# Patient Record
Sex: MALE | Employment: STUDENT | URBAN - METROPOLITAN AREA
[De-identification: names, ages, dates, MRNs, and addresses within clinical notes are randomized per-mention and may not be internally consistent; named-entity substitution may affect disease eponyms.]

---

## 2018-12-20 ENCOUNTER — OFFICE VISIT (OUTPATIENT)
Dept: FAMILY MEDICINE | Facility: CLINIC | Age: 18
End: 2018-12-20
Payer: COMMERCIAL

## 2018-12-20 VITALS
HEIGHT: 67 IN | TEMPERATURE: 98.6 F | DIASTOLIC BLOOD PRESSURE: 76 MMHG | SYSTOLIC BLOOD PRESSURE: 119 MMHG | OXYGEN SATURATION: 97 % | RESPIRATION RATE: 20 BRPM | WEIGHT: 132 LBS | BODY MASS INDEX: 20.72 KG/M2 | HEART RATE: 79 BPM

## 2018-12-20 DIAGNOSIS — K62.5 RECTAL BLEEDING: Primary | ICD-10-CM

## 2018-12-20 LAB
BASOPHILS # BLD AUTO: 0 10E9/L (ref 0–0.2)
BASOPHILS NFR BLD AUTO: 0.3 %
DIFFERENTIAL METHOD BLD: NORMAL
EOSINOPHIL # BLD AUTO: 0.1 10E9/L (ref 0–0.7)
EOSINOPHIL NFR BLD AUTO: 2.1 %
ERYTHROCYTE [DISTWIDTH] IN BLOOD BY AUTOMATED COUNT: 12.2 % (ref 10–15)
HCT VFR BLD AUTO: 45 % (ref 40–53)
HGB BLD-MCNC: 15.2 G/DL (ref 13.3–17.7)
LYMPHOCYTES # BLD AUTO: 2.2 10E9/L (ref 0.8–5.3)
LYMPHOCYTES NFR BLD AUTO: 35.4 %
MCH RBC QN AUTO: 32.4 PG (ref 26.5–33)
MCHC RBC AUTO-ENTMCNC: 33.8 G/DL (ref 31.5–36.5)
MCV RBC AUTO: 96 FL (ref 78–100)
MONOCYTES # BLD AUTO: 0.8 10E9/L (ref 0–1.3)
MONOCYTES NFR BLD AUTO: 12.9 %
NEUTROPHILS # BLD AUTO: 3 10E9/L (ref 1.6–8.3)
NEUTROPHILS NFR BLD AUTO: 49.3 %
PLATELET # BLD AUTO: 268 10E9/L (ref 150–450)
RBC # BLD AUTO: 4.69 10E12/L (ref 4.4–5.9)
WBC # BLD AUTO: 6.1 10E9/L (ref 4–11)

## 2018-12-20 PROCEDURE — 85025 COMPLETE CBC W/AUTO DIFF WBC: CPT | Performed by: PHYSICIAN ASSISTANT

## 2018-12-20 PROCEDURE — 99213 OFFICE O/P EST LOW 20 MIN: CPT | Performed by: PHYSICIAN ASSISTANT

## 2018-12-20 PROCEDURE — 36415 COLL VENOUS BLD VENIPUNCTURE: CPT | Performed by: PHYSICIAN ASSISTANT

## 2018-12-20 ASSESSMENT — MIFFLIN-ST. JEOR: SCORE: 1559.44

## 2018-12-20 ASSESSMENT — PAIN SCALES - GENERAL: PAINLEVEL: NO PAIN (0)

## 2018-12-20 NOTE — PROGRESS NOTES
"  SUBJECTIVE:   Patrick Guzman is a 18 year old male who presents to clinic today for the following health issues:      Concern - Blood in stool  Onset: Dec 12th    Description:   Blood in stool, has happened a couple times, a few spots of blood in stool and some on tissue    Intensity: 0/10    Progression of Symptoms:  same    Accompanying Signs & Symptoms:  Not eating as much like before    Previous history of similar problem:   Yes    Precipitating factors:   Worsened by: None      Therapies Tried and outcome: none   no dizziness or SOB.  No pain but sometimes has to strain but not alwAys.  .              No Known Allergies    Patient Active Problem List   Diagnosis     GERD (gastroesophageal reflux disease)       Past Medical History:   Diagnosis Date     NONSPECIFIC MEDICAL HISTORY     Full Term-Healthy     Speech delay     Positive         No current outpatient medications on file prior to visit.  No current facility-administered medications on file prior to visit.     Social History     Tobacco Use     Smoking status: Passive Smoke Exposure - Never Smoker     Smokeless tobacco: Never Used     Tobacco comment: Mother outside   Substance Use Topics     Alcohol use: No     Alcohol/week: 0.0 oz     Drug use: No       ROS:   GEN: NO fevers  ABD bleeding as above  NEURO: no dizziness    OBJECTIVE:  /76 (BP Location: Left arm, Patient Position: Sitting, Cuff Size: Adult Regular)   Pulse 79   Temp 98.6  F (37  C) (Oral)   Resp 20   Ht 1.689 m (5' 6.5\")   Wt 59.9 kg (132 lb)   SpO2 97%   BMI 20.99 kg/m     General:   awake, alert, and cooperative.  NAD.   Head: Normocephalic, atraumatic.  Eyes: Conjunctiva clear,   Lungs: Regular rate  Neuro: Alert and oriented - normal speech.    ASSESSMENT:    ICD-10-CM    1. Rectal bleeding K62.5 CBC with platelets differential     Fecal colorectal cancer screen FIT     JUST IN CASE       PLAN:   Follow up: pending labs   Advised about symptoms which might herald more " serious problems.

## 2018-12-20 NOTE — LETTER
December 20, 2018      Patrick Guzman  [address] [facility] [id number]        Dear ,    We are writing to inform you of your test results. Your blood test was normal. If you have any questions or concerns, please call the clinic at the number listed above.       Sincerely,    SHAY Carpio/arielle    Resulted Orders   CBC with platelets differential   Result Value Ref Range    WBC 6.1 4.0 - 11.0 10e9/L    RBC Count 4.69 4.4 - 5.9 10e12/L    Hemoglobin 15.2 13.3 - 17.7 g/dL    Hematocrit 45.0 40.0 - 53.0 %    MCV 96 78 - 100 fl    MCH 32.4 26.5 - 33.0 pg    MCHC 33.8 31.5 - 36.5 g/dL    RDW 12.2 10.0 - 15.0 %    Platelet Count 268 150 - 450 10e9/L    % Neutrophils 49.3 %    % Lymphocytes 35.4 %    % Monocytes 12.9 %    % Eosinophils 2.1 %    % Basophils 0.3 %    Absolute Neutrophil 3.0 1.6 - 8.3 10e9/L    Absolute Lymphocytes 2.2 0.8 - 5.3 10e9/L    Absolute Monocytes 0.8 0.0 - 1.3 10e9/L    Absolute Eosinophils 0.1 0.0 - 0.7 10e9/L    Absolute Basophils 0.0 0.0 - 0.2 10e9/L    Diff Method Automated Method

## 2018-12-20 NOTE — PATIENT INSTRUCTIONS
Patient Education     Understanding Rectal Bleeding    Rectal bleeding is when blood passes through your rectum and anus. It can happen with or without a bowel movement. Rectal bleeding may be a sign of a serious problem in your rectum, colon, or upper GI tract. Call your healthcare provider right away if you have any rectal bleeding.  The GI Tract  The gastrointestinal (GI) tract includes the mouth, esophagus, stomach, small intestine, large intestine (colon), rectum, and anus. The food you eat is digested as it passes through the GI tract. Solid waste leaves the body through the rectum.   Rectal bleeding and GI problems  The cause of rectal bleeding may be found in any region of the GI tract. The colon or rectum may be the site of your bleeding problem. Or, bleeding may be due to problems farther up the GI tract, such as in the small intestine, duodenum, or stomach.  Causes of rectal bleeding  Rectal bleeding causes include the following:    Hemorrhoids (swollen veins in the rectum and anus)    Fissures (tears in or near the anus)    Diverticulosis (inflamed pockets in the colon wall)    Infection    Ischemia (low blood flow)    Radiation damage    Inflammatory bowel disease (Crohn's disease or ulcerative colitis)    Ulcers in the upper GI tract and inflammation of the large intestine    Abnormal tissue growths (tumors or polyps) in the GI tract    A bulging rectum (also called a rectal prolapse)    Abnormal blood vessels in the small intestine or in the colon  Common symptoms  Common symptoms include the following:    Rectal pain, itching, or soreness    Belly pain or epigastric pain    Minor occasional drops of blood that appear on the stool or toilet paper, to greater amounts of stool that appear black or tarry   Rectal bleeding can also happen without pain.  Date Last Reviewed: 7/1/2016 2000-2018 [employer]. [address] [place] [address]. All rights reserved. This information is  not intended as a substitute for professional medical care. Always follow your healthcare professional's instructions.

## 2018-12-20 NOTE — RESULT ENCOUNTER NOTE
Please send letter if doesn't check mychart.  Brady Galloway PA-C    Mr. Guzman,    Your blood test was normal.    Please contact the clinic if you have additional questions or if symptoms persist.  Thank you.    Sincerely,    Keith Galloway

## 2018-12-21 PROCEDURE — 82274 ASSAY TEST FOR BLOOD FECAL: CPT | Performed by: PHYSICIAN ASSISTANT

## 2018-12-22 DIAGNOSIS — K62.5 RECTAL BLEEDING: ICD-10-CM

## 2018-12-22 LAB — HEMOCCULT STL QL IA: POSITIVE

## 2018-12-24 ENCOUNTER — TELEPHONE (OUTPATIENT)
Dept: URGENT CARE | Facility: URGENT CARE | Age: 18
End: 2018-12-24

## 2018-12-24 DIAGNOSIS — K62.5 RECTAL BLEEDING: Primary | ICD-10-CM

## 2018-12-24 NOTE — TELEPHONE ENCOUNTER
Please mail patient results which confirmed the rectal bleeding and provide number for GI referral I have ordered in this encounter- [phone]    Brady Galloway PA-C

## 2019-01-04 ENCOUNTER — TELEPHONE (OUTPATIENT)
Dept: FAMILY MEDICINE | Facility: CLINIC | Age: 19
End: 2019-01-04

## 2019-01-04 ENCOUNTER — NURSE TRIAGE (OUTPATIENT)
Dept: NURSING | Facility: CLINIC | Age: 19
End: 2019-01-04

## 2019-01-04 NOTE — TELEPHONE ENCOUNTER
17 y/o male calls in for Lab results, RN able to read him results, Fecal blood positive, GI consult made. He has additional questions for the provider, encrourged him to call back when clinic is open.    Dianna Jauregui RN - [place] Nurse Advisor  1/04/2019   3:58AM

## 2019-01-04 NOTE — TELEPHONE ENCOUNTER
Reason for Call:  Other     Detailed comments: patient received a letter yesterday about the results. Patient asking if it is for sure that the results are negative for cancer?  Also asking why he needs to be seen by a GI doctor?  Patient is leaving for [place] on Sunday the 6th, and if he can be seen there for the next step he needs to do? He will be there for 3 months.  Patient is asking if he could get a call back as soon as possible?    Phone Number Patient can be reached at: Cell number on file:    Telephone Information:   Mobile [phone]       Best Time: any    Can we leave a detailed message on this number? YES    Call taken on 1/4/2019 at 9:20 AM by Estella Tan

## 2019-01-04 NOTE — TELEPHONE ENCOUNTER
This writer attempted to contact Patrick on 01/04/19      Reason for call discuss results and questions/concerns and left message.      If patient calls back:   Patient contacted by a Registered Nurse. Inform patient that someone from the RN group will contact them, document that pt called and route to P DYAD 3 RN POOL [[id number]]        Viri Martinez RN

## 2019-01-07 NOTE — TELEPHONE ENCOUNTER
This writer attempted to contact Patrick on 01/07/19      Reason for call questions/concerns and left message.      If patient calls back:   Patient contacted by a Registered Nurse. Inform patient that someone from the RN group will contact them, document that pt called and route to P DYAD 3 RN POOL [[id number]]        Keith Wilson RN, BSN

## 2019-01-08 NOTE — TELEPHONE ENCOUNTER
It is very unlikely he has cancer, but should f/u with a colorectal specialist to confirm the diagnosis of likley internal hemorrhoids.  This could wait until he returns or can follow-up in [place].        Brady Galloway PA-C

## 2019-01-08 NOTE — TELEPHONE ENCOUNTER
Provider, it appears that patient is now in [place]. Do you advise patient see a family practice MD there to get a referral for GI or should patient wait until he returns in 3 months?     Routing to provider to review and advise.     Gabrielle Andujar RN

## 2019-01-08 NOTE — TELEPHONE ENCOUNTER
Left detailed message for the patient with the information written by the provider.    Angelina Mullins RN, [facility]

## 2019-08-14 ENCOUNTER — OFFICE VISIT (OUTPATIENT)
Dept: URGENT CARE | Facility: URGENT CARE | Age: 19
End: 2019-08-14
Payer: COMMERCIAL

## 2019-08-14 VITALS
OXYGEN SATURATION: 100 % | HEART RATE: 64 BPM | DIASTOLIC BLOOD PRESSURE: 65 MMHG | SYSTOLIC BLOOD PRESSURE: 109 MMHG | BODY MASS INDEX: 21.34 KG/M2 | WEIGHT: 134.2 LBS | TEMPERATURE: 97.9 F | RESPIRATION RATE: 16 BRPM

## 2019-08-14 DIAGNOSIS — L25.5 CONTACT DERMATITIS DUE TO PLANTS, EXCEPT FOOD, UNSPECIFIED CONTACT DERMATITIS TYPE: Primary | ICD-10-CM

## 2019-08-14 PROCEDURE — 99213 OFFICE O/P EST LOW 20 MIN: CPT | Performed by: FAMILY MEDICINE

## 2019-08-14 RX ORDER — TRIAMCINOLONE ACETONIDE 1 MG/G
CREAM TOPICAL 2 TIMES DAILY
Qty: 453.6 G | Refills: 0 | Status: SHIPPED | OUTPATIENT
Start: 2019-08-14 | End: 2024-05-29

## 2019-08-14 NOTE — PATIENT INSTRUCTIONS
Patient Education     Poison Ivy Rash  You have a rash and itching. This is a delayed reaction to the oils of the poison ivy plant. You likely came in contact with it during the 3 days before your symptoms began. Your skin will become red and itchy. Small blisters may appear. These can break and leak a clear yellow fluid. This fluid is not contagious. The reaction usually starts to go away after 1 to 2 weeks. But it may take 4 to 6 weeks to fully clear.    Home care  Follow these guidelines when caring for yourself at home:    The plant oils still on your skin or clothes can be spread to other places on your body. They can also be passed on to other people and cause a similar reaction. That s why it s important to wash all of the plant oils off your skin and any clothes that may have been exposed. Wash all clothes that you were wearing. Use hot water with ordinary laundry detergent.    Don't use over-the-counter creams that have neomycin or bacitracin. These may make the rash worse.    Stay away from anything that heats up your skin. This includes hot showers or baths, or direct sunlight. These can make itching worse.    Put a cold compress on areas that are leaking (weeping), or on blistered areas. Do this for 30 minutes 3 times a day. To make a cold compress, dip a wash cloth in a mixture of 1 pint of cold water and 1 packet of astringent or oatmeal bath powder. Keep the solution in the refrigerator for future use.    If large areas of skin are affected, take a lukewarm bath. Add colloidal oatmeal, or 1 cup of cornstarch or baking soda to the water.    For a rash in a smaller area, use hydrocortisone cream for redness and irritation. But don t use this if another medicine was prescribed. For severe itching, put an ice pack on the area. To make an ice pack, put ice cubes in a plastic bag that seals at the top. Wrap the bag in a clean, thin towel or cloth. Never put ice or an ice pack directly on the skin.  Over-the-counter products that have calamine lotion may also be helpful.    You can also use an oral antihistamine medicine with diphenhydramine for itching, unless another medicine was prescribed. This medicine may make you sleepy. So use lower doses during the daytime and higher doses at bedtime. Don t use medicine that has diphenhydramine if you have glaucoma. Also don t use it if you are a man who has trouble urinating because of an enlarged prostate. Antihistamines with loratidine cause less drowsiness. They are a good choice for daytime use.    For severe cases, your provider may prescribe oral steroid medicines. Always take these exactly as prescribed.  Follow-up care  Follow up with your healthcare provider, or as directed. Call your provider if your rash gets worse or you are not starting to get better after 1 week of treatment.  When to seek medical advice  Call your healthcare provider right away if any of these occur:    Spreading facial rash with swollen mouth or eyelids    Rash that spreads to the groin and causes swelling of the penis, scrotum, or vaginal area    Trouble urinating because of swelling in the genital area  Also call your provider if you have signs of infection in the areas of broken blisters:    Spreading redness    Pus or fluid draining from the blisters    Yellow-brown crusts form over the open blisters    Fever of 1 degree, or higher, above your normal temperature, or as directed by your provider  Call 911  Call 911 if you have severe swelling on your face, eyelids, mouth, throat, or tongue.  Date Last Reviewed: 8/1/2016 2000-2018 [employer]. [address] [place] [address], [place] [address]. All rights reserved. This information is not intended as a substitute for professional medical care. Always follow your healthcare professional's instructions.

## 2019-08-14 NOTE — PROGRESS NOTES
Chief complaint: rash    Patient has been outside helping some friend  Exposed to poison ivy   9 days ago started notiicing an itchy rash on both legs  Has tried calamine lotion  And also some on the arms         Progression of Symptoms:  worsening    Accompanying Signs & Symptoms:  Fever: no   Body aches or joint pain: no   Sore throat symptoms: no   Recent cold symptoms: no    History:   Previous similar rash: no     Precipitating factors:   Exposure to similar rash: no   New exposures: grasses   Recent travel: no     Alleviating factors:  none     Therapies Tried and outcome: above      Problem list, Medication list, Allergies, and Medical/Social/Surgical histories reviewed in EPIC and updated as appropriate.    ROS:  Constitutional, HEENT, cardiovascular, pulmonary, gi and gu systems are negative, except as otherwise noted.    OBJECTIVE:                                                    /65   Pulse 64   Temp 97.9  F (36.6  C) (Oral)   Resp 16   Wt 60.9 kg (134 lb 3.2 oz)   SpO2 100%   BMI 21.34 kg/m    Body mass index is 21.34 kg/m .  GENERAL: healthy, alert and no distress  Neurologic: Cranial nerves intact no gross neurological deficits  Psych: appropriate mood and affect  MS: no gross musculoskeletal defects noted, no edema  Skin: scaly excoriated itchy erythematous dry plaques with linear configuration over bilateral lower extremities mostly  Some on upper extremities  No signs or symptoms of secondary infection- reviewed with patient      Diagnostic Test Results:  none      ASSESSMENT/PLAN:                                                    No diagnosis found.      ICD-10-CM    1. Contact dermatitis due to plants, except food, unspecified contact dermatitis type L25.5 triamcinolone (KENALOG) 0.1 % external cream     See AVS  Patient thinks it's getting better with calamine however just still persisted  He declines oral prednisone   Zyrtec as needed itching ( less sedating than benadryl but  still watch for sedation)  Signs or symptoms of secondary bacterial infection discussed if concerns come back in.   Recommend follow up in clinic or dermatology if no relief , sooner if worse  Adverse reactions of medications discussed.  Over the counter medications discussed.   Aware to come back in if with worsening symptoms or if no relief despite treatment plan  Patient voiced understanding and had no further questions.     MD Evette Anaya MD  [facility]

## 2022-02-01 ENCOUNTER — OFFICE VISIT (OUTPATIENT)
Dept: FAMILY MEDICINE | Facility: CLINIC | Age: 22
End: 2022-02-01
Payer: COMMERCIAL

## 2022-02-01 VITALS
SYSTOLIC BLOOD PRESSURE: 113 MMHG | HEIGHT: 67 IN | HEART RATE: 103 BPM | BODY MASS INDEX: 21.35 KG/M2 | DIASTOLIC BLOOD PRESSURE: 77 MMHG | TEMPERATURE: 99.2 F | OXYGEN SATURATION: 98 % | WEIGHT: 136 LBS

## 2022-02-01 DIAGNOSIS — B27.99 INFECTIOUS MONONUCLEOSIS, WITH OTHER COMPLICATION, INFECTIOUS MONONUCLEOSIS DUE TO UNSPECIFIED ORGANISM: Primary | ICD-10-CM

## 2022-02-01 DIAGNOSIS — J03.90 TONSILLITIS: ICD-10-CM

## 2022-02-01 PROCEDURE — 99213 OFFICE O/P EST LOW 20 MIN: CPT | Performed by: PHYSICIAN ASSISTANT

## 2022-02-01 RX ORDER — DOXYCYCLINE 100 MG/1
100 CAPSULE ORAL 2 TIMES DAILY
Qty: 20 CAPSULE | Refills: 0 | Status: SHIPPED | OUTPATIENT
Start: 2022-02-01 | End: 2022-02-11

## 2022-02-01 RX ORDER — PREDNISONE 20 MG/1
40 TABLET ORAL DAILY
Qty: 10 TABLET | Refills: 0 | Status: SHIPPED | OUTPATIENT
Start: 2022-02-01 | End: 2022-02-06

## 2022-02-01 RX ORDER — LIDOCAINE HYDROCHLORIDE 20 MG/ML
15 SOLUTION OROPHARYNGEAL
Qty: 100 ML | Refills: 1 | Status: SHIPPED | OUTPATIENT
Start: 2022-02-01 | End: 2024-05-29

## 2022-02-01 ASSESSMENT — MIFFLIN-ST. JEOR: SCORE: 1562.58

## 2022-02-01 NOTE — PROGRESS NOTES
"  {PROVIDER CHARTING PREFERENCE:418723}    Wilfred Nguyen is a 21 year old who presents for the following health issues {ACCOMPANIED BY STATEMENT (Optional):311090}    HPI     Acute Illness  Acute illness concerns: Swollen Tonsils  Onset/Duration: ***  Symptoms:  Fever: {.:963000::\"no\"}  Chills/Sweats: {.:603438::\"no\"}  Headache (location?): {.:532440::\"no\"}  Sinus Pressure: {.:507211::\"no\"}  Conjunctivitis:  {.:831308::\"no\"}  Ear Pain: {.:845688::\"no\"}  Rhinorrhea: {.:178037::\"no\"}  Congestion: {.:735138::\"no\"}  Sore Throat: {.:922770::\"no\"}  Cough: {.:296572::\"no\"}  Wheeze: {.:696271::\"no\"}  Decreased Appetite: {.:713359::\"no\"}  Nausea: {.:151813::\"no\"}  Vomiting: {.:639097::\"no\"}  Diarrhea: {.:739095::\"no\"}  Dysuria/Freq.: {.:138315::\"no\"}  Dysuria or Hematuria: {.:697379::\"no\"}  Fatigue/Achiness: {.:917609::\"no\"}  Sick/Strep Exposure: {.:164251::\"no\"}  Therapies tried and outcome: {NONEORCHOOSE:712684::\"None\"}  {additonal problems for provider to add (Optional):457058}    Review of Systems   {ROS COMP (Optional):536754}      Objective    There were no vitals taken for this visit.  There is no height or weight on file to calculate BMI.  Physical Exam   {Exam List (Optional):144672}    {Diagnostic Test Results (Optional):036339}    {AMBULATORY ATTESTATION (Optional):052533}        "

## 2022-02-01 NOTE — PROGRESS NOTES
"  Assessment & Plan       ICD-10-CM    1. Infectious mononucleosis, with other complication, infectious mononucleosis due to unspecified organism  B27.99 predniSONE (DELTASONE) 20 MG tablet     doxycycline hyclate (VIBRAMYCIN) 100 MG capsule     Otolaryngology Referral     lidocaine, viscous, (XYLOCAINE) 2 % solution   2. Tonsillitis  J03.90 predniSONE (DELTASONE) 20 MG tablet     doxycycline hyclate (VIBRAMYCIN) 100 MG capsule     Otolaryngology Referral     lidocaine, viscous, (XYLOCAINE) 2 % solution     Talk to patient on his concerns we'll get him started on prednisone and doxycycline and viscous lidocaine solution. Will have follow-up with ENT in the next couple days. Warning signs side effects were discussed. If he continues to not be able to get a drink much he may need to be seen in the emergency room.    Return in about 2 days (around 2/3/2022).    Carlton Quinonez PA-C  [facility]   Patrick is a 21 year old who presents for the following health issues     HPI     Mass in the back of the throat that has doubled in size in the past 2 days. Patient also has mono. Patient states he is dehydrated due not being able to swollow.     WON Uribe    He states he was diagnosed with mono at a [facility] clinic. He states he has been having a difficult time swallowing and eating. He is hardly being able to swallow water at all he can hardly swallow spit because of the pain in the right tonsillar. It radiates into his right ear. He denies any fevers chills or body aches. He has fatigue.    Review of Systems   Constitutional, HEENT, cardiovascular, pulmonary, gi and gu systems are negative, except as otherwise noted.      Objective    /77   Pulse 103   Temp 99.2  F (37.3  C) (Tympanic)   Ht 1.689 m (5' 6.5\")   Wt 61.7 kg (136 lb)   SpO2 98%   BMI 21.62 kg/m    Body mass index is 21.62 kg/m .  Physical Exam   GENERAL: healthy, alert and no distress  Head: Normocephalic, " atraumatic.  Eyes: Conjunctiva clear, non icteric. PERRLA.  Ears: External ears and TMs normal BL.  Nose: Septum midline, nasal mucosa pink and moist. No discharge.  Mouth / Throat: Normal dentition.  No oral lesions. Pharynx  erythematous, tonsils with hypertrophy. White exudates right tonsil  Neck: Supple,  enlarged LN, trachea midline.  Heart: S1 and S2 normal, no murmurs, clicks, gallops or rubs. Regular rate and rhythm. Chest: Clear; no wheezes or rales.  The abdomen is soft without tenderness, guarding, mass, rebound or organomegaly. Bowel sounds are normal.

## 2022-02-11 ENCOUNTER — OFFICE VISIT (OUTPATIENT)
Dept: OTOLARYNGOLOGY | Facility: CLINIC | Age: 22
End: 2022-02-11
Payer: COMMERCIAL

## 2022-02-11 VITALS
HEART RATE: 92 BPM | DIASTOLIC BLOOD PRESSURE: 63 MMHG | RESPIRATION RATE: 16 BRPM | OXYGEN SATURATION: 100 % | SYSTOLIC BLOOD PRESSURE: 98 MMHG

## 2022-02-11 DIAGNOSIS — J03.80 ACUTE TONSILLITIS DUE TO INFECTIOUS MONONUCLEOSIS: Primary | ICD-10-CM

## 2022-02-11 DIAGNOSIS — B27.90 ACUTE TONSILLITIS DUE TO INFECTIOUS MONONUCLEOSIS: Primary | ICD-10-CM

## 2022-02-11 PROCEDURE — 99203 OFFICE O/P NEW LOW 30 MIN: CPT | Performed by: OTOLARYNGOLOGY

## 2022-02-11 NOTE — PROGRESS NOTES
I am seeing this patient in consultation for mononucleosis, tonsillitis at the request of the provider Carlton Quinonez.    Chief Complaint - tonsillitis    History of Present Illness - Patrick Guzman is a 21 year old male with acute tonsillitis. The patient provides the history. He got mono 3 weeks ago. He developed a severe sore throat with swelling of the tonsils. He hasn't had prior tonsil problems. He shows a picture of severe tonsil swelling with exudate. He was given doxycycline and prednisone. He can swallow okay now. He feels much better.     Past Medical History -   Patient Active Problem List   Diagnosis     GERD (gastroesophageal reflux disease)       Current Medications -   Current Outpatient Medications:      doxycycline hyclate (VIBRAMYCIN) 100 MG capsule, Take 1 capsule (100 mg) by mouth 2 times daily for 10 days, Disp: 20 capsule, Rfl: 0     lidocaine, viscous, (XYLOCAINE) 2 % solution, Swish and spit 15 mLs in mouth every 3 hours as needed for moderate pain ; Max 8 doses/24 hour period., Disp: 100 mL, Rfl: 1     triamcinolone (KENALOG) 0.1 % external cream, Apply topically 2 times daily For 2 weeks (Patient not taking: Reported on 2/1/2022), Disp: 453.6 g, Rfl: 0    Allergies - No Known Allergies    Social History -   Social History     Socioeconomic History     Marital status: Single     Spouse name: Not on file     Number of children: Not on file     Years of education: Not on file     Highest education level: Not on file   Occupational History     Not on file   Tobacco Use     Smoking status: Passive Smoke Exposure - Never Smoker     Smokeless tobacco: Never Used     Tobacco comment: Mother outside   Substance and Sexual Activity     Alcohol use: No     Alcohol/week: 0.0 standard drinks     Drug use: No     Sexual activity: Not Currently   Other Topics Concern     Not on file   Social History Narrative     Not on file     Social Determinants of Health     Financial Resource Strain: Not on file    Food Insecurity: Not on file   Transportation Needs: Not on file   Physical Activity: Not on file   Stress: Not on file   Social Connections: Not on file   Intimate Partner Violence: Not on file   Housing Stability: Not on file     Review of Systems - As per HPI and PMHx, otherwise 10+ comprehensive system review is negative.    Physical Exam  BP 98/63   Pulse 92   Resp 16   SpO2 100%   General - The patient is in no distress.  Alert and oriented, answers questions and cooperates with examination appropriately.   Voice and Breathing - The patient was breathing comfortably without the use of accessory muscles. There was no wheezing, stridor, or stertor.  The patients voice was clear and strong.  Eyes - Extraocular movements intact. Sclera were not icteric or injected, conjunctiva were pink and moist.  Neurologic - Cranial nerves II-XII are grossly intact. Specifically, the facial nerve is intact, House-Brackmann grade 1 of 6.   Mouth - Examination of the oral cavity showed pink, healthy oral mucosa. No lesions or ulcerations noted.  The tongue was mobile and protrudes midline.  Oropharynx - The walls of the oropharynx were smooth, pink, moist, symmetric, and had no lesions or ulcerations.  The tonsils were 1-2+, pink, no exudate. The uvula was midline and the palate raised symmetrically.   Neck -  Soft, non-tender. Palpation of the occipital, submental, submandibular, internal jugular chain, and supraclavicular nodes did not demonstrate any abnormal lymph nodes or masses. The parotid glands were without masses. Palpation of the thyroid was soft and smooth, with no nodules or goiter appreciated.  The trachea was midline.    A/P - Patrick Guzman is a 21 year old male with a bout of infectious mononucleosis that caused acute tonsillitis.  This has significantly improved with doxycycline and prednisone.  He is able to swallow without difficulty.  I see no exudate.  I cautioned him on no vigorous activity, contact  sports, or weightlifting for the next month.  Has not had prior tonsillitis and therefore does not need to follow-up with us if this becomes a recurrent problem.     Manuel Bello MD  Otolaryngology  [facility]

## 2024-05-29 ENCOUNTER — OFFICE VISIT (OUTPATIENT)
Dept: FAMILY MEDICINE | Facility: CLINIC | Age: 24
End: 2024-05-29
Payer: COMMERCIAL

## 2024-05-29 VITALS
WEIGHT: 131.2 LBS | BODY MASS INDEX: 21.08 KG/M2 | OXYGEN SATURATION: 99 % | SYSTOLIC BLOOD PRESSURE: 114 MMHG | TEMPERATURE: 97 F | HEART RATE: 70 BPM | HEIGHT: 66 IN | RESPIRATION RATE: 14 BRPM | DIASTOLIC BLOOD PRESSURE: 73 MMHG

## 2024-05-29 DIAGNOSIS — Z13.1 SCREENING FOR DIABETES MELLITUS: ICD-10-CM

## 2024-05-29 DIAGNOSIS — Z13.21 ENCOUNTER FOR VITAMIN DEFICIENCY SCREENING: ICD-10-CM

## 2024-05-29 DIAGNOSIS — Z76.89 ENCOUNTER TO ESTABLISH CARE: Primary | ICD-10-CM

## 2024-05-29 DIAGNOSIS — F32.A DEPRESSION, UNSPECIFIED DEPRESSION TYPE: ICD-10-CM

## 2024-05-29 PROCEDURE — 99213 OFFICE O/P EST LOW 20 MIN: CPT

## 2024-05-29 RX ORDER — MULTIVITAMIN
1 TABLET ORAL DAILY
COMMUNITY

## 2024-05-29 ASSESSMENT — PATIENT HEALTH QUESTIONNAIRE - PHQ9
SUM OF ALL RESPONSES TO PHQ QUESTIONS 1-9: 16
SUM OF ALL RESPONSES TO PHQ QUESTIONS 1-9: 16
10. IF YOU CHECKED OFF ANY PROBLEMS, HOW DIFFICULT HAVE THESE PROBLEMS MADE IT FOR YOU TO DO YOUR WORK, TAKE CARE OF THINGS AT HOME, OR GET ALONG WITH OTHER PEOPLE: SOMEWHAT DIFFICULT

## 2024-05-29 ASSESSMENT — PAIN SCALES - GENERAL: PAINLEVEL: NO PAIN (0)

## 2024-05-29 NOTE — PROGRESS NOTES
Assessment & Plan     Encounter to establish care  - Reviewed patient's lifestyle, current concerns and history.  - Due for annual exam, encouraged to schedule.     Depression, unspecified depression type  - Elevated PHQ-9 today. Briefly discussed with patient. He declines resources today.     Encounter for vitamin deficiency screening  - Family history of vitamin D deficiency. Not on supplementation. Recommended 2000 international unit(s) of vitamin D daily.   - Vitamin D deficiency screening    Screening for diabetes mellitus  - Family history of diabetes.   - Glucose    Depression Screening Follow Up        5/29/2024     1:42 PM   PHQ   PHQ-9 Total Score 16   Q9: Thoughts of better off dead/self-harm past 2 weeks Several days   F/U: Thoughts of suicide or self-harm No   F/U: Safety concerns No         5/29/2024     1:42 PM   Last PHQ-9   1.  Little interest or pleasure in doing things 1   2.  Feeling down, depressed, or hopeless 1   3.  Trouble falling or staying asleep, or sleeping too much 3   4.  Feeling tired or having little energy 3   5.  Poor appetite or overeating 3   6.  Feeling bad about yourself 3   7.  Trouble concentrating 1   8.  Moving slowly or restless 0   Q9: Thoughts of better off dead/self-harm past 2 weeks 1   PHQ-9 Total Score 16   In the past two weeks have you had thoughts of suicide or self harm? No   Do you have concerns about your personal safety or the safety of others? No     Declines resources at this time. Seen in ER 2/2024 for passive suicidal thoughts without plan. He has been busy and struggling with self-care. Denies suicidal plan or intent, denies safety concerns.     Follow Up Actions Taken  Crisis resource information provided in the After Visit Summary  Patient declined referral.  Follow up offered as needed.     Discussed the following ways the patient can remain in a safe environment:  be around others    Due for annual exam, patient encouraged to schedule.  "    Wilfred Nguyen is a 24 year old, presenting for the following health issues:  Establish Care        5/29/2024     1:49 PM   Additional Questions   Roomed by ashley         5/29/2024     1:49 PM   Patient Reported Additional Medications   Patient reports taking the following new medications none     History of Present Illness       Reason for visit:  [facility]    He eats 0-1 servings of fruits and vegetables daily.He consumes 2 sweetened beverage(s) daily.He exercises with enough effort to increase his heart rate 10 to 19 minutes per day.  He exercises with enough effort to increase his heart rate 3 or less days per week.   He is taking medications regularly.        Review of Systems  Constitutional, HEENT, cardiovascular, pulmonary, gi and gu systems are negative, except as otherwise noted.      Objective    /73 (BP Location: Left arm, Patient Position: Sitting, Cuff Size: Adult Regular)   Pulse 70   Temp 97  F (36.1  C) (Tympanic)   Resp 14   Ht 1.676 m (5' 6\")   Wt 59.5 kg (131 lb 3.2 oz)   SpO2 99%   BMI 21.18 kg/m    Body mass index is 21.18 kg/m .    Physical Exam   GENERAL: alert and no distress  NECK: no adenopathy, no asymmetry, masses, or scars  RESP: Normal work of breathing, no cough or audible wheezing  MS: no gross musculoskeletal defects noted, no edema      Signed Electronically by: KWASI Brock CNP    "